# Patient Record
Sex: FEMALE | Race: WHITE | Employment: UNEMPLOYED | URBAN - METROPOLITAN AREA
[De-identification: names, ages, dates, MRNs, and addresses within clinical notes are randomized per-mention and may not be internally consistent; named-entity substitution may affect disease eponyms.]

---

## 2019-12-30 LAB
BASOPHILS # BLD: 0.1 K/UL (ref 0–0.2)
BASOPHILS NFR BLD: 1 % (ref 0–2)
DIFFERENTIAL METHOD BLD: ABNORMAL
EOSINOPHIL # BLD: 0.4 K/UL (ref 0–0.8)
EOSINOPHIL NFR BLD: 6 % (ref 0.5–7.8)
ERYTHROCYTE [DISTWIDTH] IN BLOOD BY AUTOMATED COUNT: 14.9 % (ref 11.9–14.6)
HCT VFR BLD AUTO: 39.3 % (ref 35.8–46.3)
HGB BLD-MCNC: 13 G/DL (ref 11.7–15.4)
IMM GRANULOCYTES # BLD AUTO: 0 K/UL (ref 0–0.5)
IMM GRANULOCYTES NFR BLD AUTO: 0 % (ref 0–5)
LYMPHOCYTES # BLD: 2.7 K/UL (ref 0.5–4.6)
LYMPHOCYTES NFR BLD: 37 % (ref 13–44)
MCH RBC QN AUTO: 26.2 PG (ref 26.1–32.9)
MCHC RBC AUTO-ENTMCNC: 33.1 G/DL (ref 31.4–35)
MCV RBC AUTO: 79.2 FL (ref 79.6–97.8)
MONOCYTES # BLD: 0.7 K/UL (ref 0.1–1.3)
MONOCYTES NFR BLD: 10 % (ref 4–12)
NEUTS SEG # BLD: 3.3 K/UL (ref 1.7–8.2)
NEUTS SEG NFR BLD: 46 % (ref 43–78)
NRBC # BLD: 0 K/UL (ref 0–0.2)
PLATELET # BLD AUTO: 395 K/UL (ref 150–450)
PMV BLD AUTO: 8.8 FL (ref 9.4–12.3)
RBC # BLD AUTO: 4.96 M/UL (ref 4.05–5.2)
WBC # BLD AUTO: 7.2 K/UL (ref 4.3–11.1)

## 2019-12-30 PROCEDURE — 80053 COMPREHEN METABOLIC PANEL: CPT

## 2019-12-30 PROCEDURE — 93005 ELECTROCARDIOGRAM TRACING: CPT | Performed by: EMERGENCY MEDICINE

## 2019-12-30 PROCEDURE — 81003 URINALYSIS AUTO W/O SCOPE: CPT | Performed by: EMERGENCY MEDICINE

## 2019-12-30 PROCEDURE — 99285 EMERGENCY DEPT VISIT HI MDM: CPT | Performed by: EMERGENCY MEDICINE

## 2019-12-30 PROCEDURE — 84481 FREE ASSAY (FT-3): CPT

## 2019-12-30 PROCEDURE — 84439 ASSAY OF FREE THYROXINE: CPT

## 2019-12-30 PROCEDURE — 84443 ASSAY THYROID STIM HORMONE: CPT

## 2019-12-30 PROCEDURE — 85025 COMPLETE CBC W/AUTO DIFF WBC: CPT

## 2019-12-31 ENCOUNTER — APPOINTMENT (OUTPATIENT)
Dept: GENERAL RADIOLOGY | Age: 24
End: 2019-12-31
Attending: EMERGENCY MEDICINE
Payer: COMMERCIAL

## 2019-12-31 ENCOUNTER — HOSPITAL ENCOUNTER (EMERGENCY)
Age: 24
Discharge: HOME | End: 2019-12-31
Attending: EMERGENCY MEDICINE
Payer: COMMERCIAL

## 2019-12-31 VITALS
OXYGEN SATURATION: 100 % | BODY MASS INDEX: 21.34 KG/M2 | TEMPERATURE: 98.5 F | RESPIRATION RATE: 22 BRPM | SYSTOLIC BLOOD PRESSURE: 156 MMHG | HEART RATE: 111 BPM | WEIGHT: 125 LBS | DIASTOLIC BLOOD PRESSURE: 67 MMHG | HEIGHT: 64 IN

## 2019-12-31 DIAGNOSIS — E05.90 HYPERTHYROIDISM: Primary | ICD-10-CM

## 2019-12-31 LAB
ALBUMIN SERPL-MCNC: 3.5 G/DL (ref 3.5–5)
ALBUMIN/GLOB SERPL: 0.9 {RATIO} (ref 1.2–3.5)
ALP SERPL-CCNC: 187 U/L (ref 50–136)
ALT SERPL-CCNC: 30 U/L (ref 12–65)
ANION GAP SERPL CALC-SCNC: 6 MMOL/L (ref 7–16)
AST SERPL-CCNC: 15 U/L (ref 15–37)
ATRIAL RATE: 101 BPM
BILIRUB SERPL-MCNC: 0.3 MG/DL (ref 0.2–1.1)
BNP SERPL-MCNC: 281 PG/ML (ref 5–125)
BUN SERPL-MCNC: 18 MG/DL (ref 6–23)
CALCIUM SERPL-MCNC: 9.9 MG/DL (ref 8.3–10.4)
CALCULATED P AXIS, ECG09: 5 DEGREES
CALCULATED R AXIS, ECG10: 80 DEGREES
CALCULATED T AXIS, ECG11: 46 DEGREES
CHLORIDE SERPL-SCNC: 110 MMOL/L (ref 98–107)
CO2 SERPL-SCNC: 24 MMOL/L (ref 21–32)
CREAT SERPL-MCNC: 0.41 MG/DL (ref 0.6–1)
DIAGNOSIS, 93000: NORMAL
GLOBULIN SER CALC-MCNC: 3.9 G/DL (ref 2.3–3.5)
GLUCOSE SERPL-MCNC: 102 MG/DL (ref 65–100)
HCG UR QL: NEGATIVE
MAGNESIUM SERPL-MCNC: 2.2 MG/DL (ref 1.8–2.4)
P-R INTERVAL, ECG05: 126 MS
POTASSIUM SERPL-SCNC: 4 MMOL/L (ref 3.5–5.1)
PROT SERPL-MCNC: 7.4 G/DL (ref 6.3–8.2)
Q-T INTERVAL, ECG07: 362 MS
QRS DURATION, ECG06: 98 MS
QTC CALCULATION (BEZET), ECG08: 469 MS
SODIUM SERPL-SCNC: 140 MMOL/L (ref 136–145)
T4 FREE SERPL-MCNC: 3.4 NG/DL (ref 0.9–1.8)
TSH SERPL DL<=0.005 MIU/L-ACNC: <0.005 UIU/ML (ref 0.36–3.74)
VENTRICULAR RATE, ECG03: 101 BPM

## 2019-12-31 PROCEDURE — 96360 HYDRATION IV INFUSION INIT: CPT | Performed by: EMERGENCY MEDICINE

## 2019-12-31 PROCEDURE — 83880 ASSAY OF NATRIURETIC PEPTIDE: CPT

## 2019-12-31 PROCEDURE — 81025 URINE PREGNANCY TEST: CPT

## 2019-12-31 PROCEDURE — 71046 X-RAY EXAM CHEST 2 VIEWS: CPT

## 2019-12-31 PROCEDURE — 83735 ASSAY OF MAGNESIUM: CPT

## 2019-12-31 PROCEDURE — 74011250636 HC RX REV CODE- 250/636: Performed by: EMERGENCY MEDICINE

## 2019-12-31 RX ORDER — METHIMAZOLE 10 MG/1
TABLET ORAL
Qty: 90 TAB | Refills: 0 | Status: SHIPPED | OUTPATIENT
Start: 2019-12-31 | End: 2020-01-08 | Stop reason: SDUPTHER

## 2019-12-31 RX ORDER — PROPRANOLOL HYDROCHLORIDE 60 MG/1
60 TABLET ORAL 2 TIMES DAILY
Qty: 60 TAB | Refills: 0 | Status: SHIPPED | OUTPATIENT
Start: 2019-12-31 | End: 2020-01-08 | Stop reason: SDUPTHER

## 2019-12-31 RX ADMIN — SODIUM CHLORIDE 1000 ML: 900 INJECTION, SOLUTION INTRAVENOUS at 03:09

## 2019-12-31 NOTE — ED PROVIDER NOTES
25-year-old female recently diagnosed with hyperthyroidism presents with complaint of dizziness, fatigue, and palpitations. States that symptoms progressively worsened on yesterday. States that she was started on metoprolol 50 mg daily and methimazole 10 mg daily around 1 month ago after being evaluated at urgent care. States that she has a scheduled appointment with endocrinologist on January 8, 2019. She denies any shortness of breath at this time. Patient denies abdominal pain, productive cough, chest pain, hemoptysis, leg swelling or pain, nausea, vomiting, weight loss, night sweats, fever, chills, urinary symptoms. The history is provided by the patient. No  was used. Dizziness   This is a new problem. The current episode started yesterday. The problem has not changed since onset. There was no focality noted. Pertinent negatives include no focal weakness, no loss of sensation, no loss of balance, no slurred speech, no speech difficulty, no memory loss, no movement disorder, no agitation, no visual change, no auditory change, no mental status change, no unresponsiveness and no disorientation. There has been no fever. Pertinent negatives include no shortness of breath, no chest pain, no vomiting, no altered mental status, no confusion, no headaches, no choking, no nausea, no bowel incontinence and no bladder incontinence. There were no medications administered prior to arrival.        No past medical history on file. No past surgical history on file. No family history on file.     Social History     Socioeconomic History    Marital status:      Spouse name: Not on file    Number of children: Not on file    Years of education: Not on file    Highest education level: Not on file   Occupational History    Not on file   Social Needs    Financial resource strain: Not on file    Food insecurity:     Worry: Not on file     Inability: Not on file    Transportation needs: Medical: Not on file     Non-medical: Not on file   Tobacco Use    Smoking status: Not on file   Substance and Sexual Activity    Alcohol use: Not on file    Drug use: Not on file    Sexual activity: Not on file   Lifestyle    Physical activity:     Days per week: Not on file     Minutes per session: Not on file    Stress: Not on file   Relationships    Social connections:     Talks on phone: Not on file     Gets together: Not on file     Attends Roman [employer] service: Not on file     Active member of club or organization: Not on file     Attends meetings of clubs or organizations: Not on file     Relationship status: Not on file    Intimate partner violence:     Fear of current or ex partner: Not on file     Emotionally abused: Not on file     Physically abused: Not on file     Forced sexual activity: Not on file   Other Topics Concern    Not on file   Social History Narrative    Not on file         ALLERGIES: Patient has no known allergies. Review of Systems   Constitutional: Positive for fatigue. Negative for chills, diaphoresis and fever. HENT: Negative for congestion and rhinorrhea. Respiratory: Negative for cough, choking and shortness of breath. Cardiovascular: Positive for palpitations. Negative for chest pain. Gastrointestinal: Negative for abdominal pain, blood in stool, bowel incontinence, constipation, diarrhea, nausea and vomiting. Genitourinary: Negative for bladder incontinence, dysuria, flank pain, vaginal bleeding and vaginal discharge. Musculoskeletal: Negative for neck pain and neck stiffness. Skin: Negative for rash and wound. Neurological: Positive for dizziness. Negative for focal weakness, seizures, facial asymmetry, speech difficulty, weakness, numbness, headaches and loss of balance. Psychiatric/Behavioral: Negative for agitation, confusion and memory loss.        Vitals:    12/30/19 2319   BP: 132/68   Pulse: (!) 118   Resp: 16   Temp: 98.5 °F (36.9 °C) SpO2: 96%   Weight: 56.7 kg (125 lb)   Height: 5' 4\" (1.626 m)            Physical Exam  Vitals signs and nursing note reviewed. Constitutional:       Appearance: She is well-developed. Comments: Well appearing and in NAD. HENT:      Head: Normocephalic. Comments: MMM. Uvula midline. Patient tolerating secretions. Eyes:      Conjunctiva/sclera: Conjunctivae normal.      Pupils: Pupils are equal, round, and reactive to light. Comments: Proptosis. Neck:      Vascular: No JVD. Trachea: No tracheal deviation. Comments: Thyromegaly present. Cardiovascular:      Rate and Rhythm: Regular rhythm. Tachycardia present. Pulses: Normal pulses. Heart sounds: Normal heart sounds. Pulmonary:      Effort: Pulmonary effort is normal.      Breath sounds: Normal breath sounds. Comments: CTAB. Abdominal:      Palpations: Abdomen is soft. Comments: Soft, NTND. Musculoskeletal: Normal range of motion. Comments: No LE edema. No calf TTP. Skin:     General: Skin is warm and dry. Comments: No rash. Neurological:      General: No focal deficit present. Mental Status: She is alert and oriented to person, place, and time. Cranial Nerves: No cranial nerve deficit. Comments: Strength 5/5 throughout. Normal sensory. MDM  Number of Diagnoses or Management Options  Hyperthyroidism: new and requires workup  Diagnosis management comments: TSH less than 0.005. Free T4 3.4. UPT negative. UA negative. Remainder of labs unremarkable. Chest x-ray clear. EKG with sinus tachycardia. Patient given 1 L normal sent IV fluid bolus. Orthostatics negative. Endocrinology consulted. Dr. Raffi Nash on call. Recommends switching patient from metoprolol to propranolol 60 mg twice daily as well as increasing patient for methimazole 10 mg daily to 30 mg daily. States that he believes that he will be able to get her in later this week.   Patient given strict return precautions. Patient in agreement with plan. Amount and/or Complexity of Data Reviewed  Clinical lab tests: ordered and reviewed  Tests in the radiology section of CPT®: ordered and reviewed  Tests in the medicine section of CPT®: ordered and reviewed  Review and summarize past medical records: yes  Independent visualization of images, tracings, or specimens: yes    Risk of Complications, Morbidity, and/or Mortality  Presenting problems: moderate  Diagnostic procedures: moderate  Management options: moderate      ED Course as of Dec 31 0405   Tue Dec 31, 2019   0225 CXR IMPRESSION: Normal chest.    [DF]   0340 Orthostatics within normal limits. [DF]   3308 Endocrinology consulted. [DF]      ED Course User Index  [DF] Jennifer Hodgson MD       EKG  Date/Time: 12/31/2019 1:54 AM  Performed by: Jennifer Hodgson MD  Authorized by:  Jennifer Hodgson MD     ECG reviewed by ED Physician in the absence of a cardiologist: yes    Rate:     ECG rate:  101    ECG rate assessment: tachycardic    Rhythm:     Rhythm: sinus tachycardia    Ectopy:     Ectopy: none    QRS:     QRS axis:  Normal    QRS intervals:  Normal  ST segments:     ST segments:  Normal  T waves:     T waves: normal            Results Include:    Recent Results (from the past 24 hour(s))   EKG, 12 LEAD, INITIAL    Collection Time: 12/30/19 11:23 PM   Result Value Ref Range    Ventricular Rate 101 BPM    Atrial Rate 101 BPM    P-R Interval 126 ms    QRS Duration 98 ms    Q-T Interval 362 ms    QTC Calculation (Bezet) 469 ms    Calculated P Axis 5 degrees    Calculated R Axis 80 degrees    Calculated T Axis 46 degrees    Diagnosis       Sinus tachycardia  Minimal voltage criteria for LVH, may be normal variant  Nonspecific ST and T wave abnormality  Abnormal ECG  No previous ECGs available     CBC WITH AUTOMATED DIFF    Collection Time: 12/30/19 11:29 PM   Result Value Ref Range    WBC 7.2 4.3 - 11.1 K/uL    RBC 4. 96 4.05 - 5.2 M/uL    HGB 13.0 11.7 - 15.4 g/dL    HCT 39.3 35.8 - 46.3 %    MCV 79.2 (L) 79.6 - 97.8 FL    MCH 26.2 26.1 - 32.9 PG    MCHC 33.1 31.4 - 35.0 g/dL    RDW 14.9 (H) 11.9 - 14.6 %    PLATELET 390 558 - 506 K/uL    MPV 8.8 (L) 9.4 - 12.3 FL    ABSOLUTE NRBC 0.00 0.0 - 0.2 K/uL    DF AUTOMATED      NEUTROPHILS 46 43 - 78 %    LYMPHOCYTES 37 13 - 44 %    MONOCYTES 10 4.0 - 12.0 %    EOSINOPHILS 6 0.5 - 7.8 %    BASOPHILS 1 0.0 - 2.0 %    IMMATURE GRANULOCYTES 0 0.0 - 5.0 %    ABS. NEUTROPHILS 3.3 1.7 - 8.2 K/UL    ABS. LYMPHOCYTES 2.7 0.5 - 4.6 K/UL    ABS. MONOCYTES 0.7 0.1 - 1.3 K/UL    ABS. EOSINOPHILS 0.4 0.0 - 0.8 K/UL    ABS. BASOPHILS 0.1 0.0 - 0.2 K/UL    ABS. IMM. GRANS. 0.0 0.0 - 0.5 K/UL   METABOLIC PANEL, COMPREHENSIVE    Collection Time: 12/30/19 11:29 PM   Result Value Ref Range    Sodium 140 136 - 145 mmol/L    Potassium 4.0 3.5 - 5.1 mmol/L    Chloride 110 (H) 98 - 107 mmol/L    CO2 24 21 - 32 mmol/L    Anion gap 6 (L) 7 - 16 mmol/L    Glucose 102 (H) 65 - 100 mg/dL    BUN 18 6 - 23 MG/DL    Creatinine 0.41 (L) 0.6 - 1.0 MG/DL    GFR est AA >60 >60 ml/min/1.73m2    GFR est non-AA >60 >60 ml/min/1.73m2    Calcium 9.9 8.3 - 10.4 MG/DL    Bilirubin, total 0.3 0.2 - 1.1 MG/DL    ALT (SGPT) 30 12 - 65 U/L    AST (SGOT) 15 15 - 37 U/L    Alk. phosphatase 187 (H) 50 - 136 U/L    Protein, total 7.4 6.3 - 8.2 g/dL    Albumin 3.5 3.5 - 5.0 g/dL    Globulin 3.9 (H) 2.3 - 3.5 g/dL    A-G Ratio 0.9 (L) 1.2 - 3.5     TSH 3RD GENERATION    Collection Time: 12/30/19 11:29 PM   Result Value Ref Range    TSH <0.005 (L) 0.358 - 3.740 uIU/mL              Reese Pinon MD; 12/31/2019 @1:54 AM Voice dictation software was used during the making of this note. This software is not perfect and grammatical and other typographical errors may be present.   This note has not been proofread for errors.  ===================================================================

## 2019-12-31 NOTE — ED TRIAGE NOTES
Reports dizziness, shortness of breath, fatigue and palpitations. Onset yesterday however has been seen twice at urgent care, once for swelling to neck and palpitations and once for strep. Recently completed abx for strep. Denies cough or chest pain. Recently placed on meds \"to slow my heart and one to slow my thyroid\".  Scheduled appt with endocrinologist 1/8

## 2020-01-01 LAB — T3FREE SERPL-MCNC: 22.9 PG/ML (ref 2–4.4)

## 2020-01-08 PROBLEM — E04.1 THYROID NODULE: Status: ACTIVE | Noted: 2020-01-08

## 2020-01-08 PROBLEM — R00.2 PALPITATIONS: Status: ACTIVE | Noted: 2020-01-08

## 2020-01-08 PROBLEM — E05.00 GRAVES' DISEASE: Status: ACTIVE | Noted: 2020-01-08

## 2020-01-08 PROBLEM — H05.20 EXOPHTHALMOS: Status: ACTIVE | Noted: 2020-01-08

## 2020-07-30 DIAGNOSIS — E05.00 GRAVES DISEASE: Primary | ICD-10-CM

## 2020-08-05 ENCOUNTER — HOSPITAL ENCOUNTER (OUTPATIENT)
Dept: SURGERY | Age: 25
Discharge: HOME | End: 2020-08-05

## 2020-08-06 VITALS — WEIGHT: 126 LBS | HEIGHT: 64 IN | BODY MASS INDEX: 21.51 KG/M2

## 2020-08-06 RX ORDER — LEVONORGESTREL / ETHINYL ESTRADIOL AND ETHINYL ESTRADIOL 150-30(84)
1 KIT ORAL
COMMUNITY

## 2020-08-06 NOTE — PERIOP NOTES
Patient verified name and . Order for consent found in EHR and matches case posting; patient verifies procedure. Type 2 surgery, phone assessment complete. Orders received. Labs per surgeon: none needed. Labs per anesthesia protocol: HGB. Coming to the outpatient lab. Instructed on where and when to come. EKG: not needed per anesthesia protocols. ECHO (2020) and EKG (19) available in EHR for reference if needed. Patient answered medical/surgical history questions at their best of ability. All prior to admission medications documented in [facility]. Patient instructed to take the following medications the day of surgery according to anesthesia guidelines with a small sip of water: Methimazole, Propranolol. Hold all vitamins 7 days prior to surgery and NSAIDS 5 days prior to surgery. Prescription meds to hold: none. Instructed to bring birthcontrol dos. Patient instructed on the following:    >A negative Covid swab result is required to proceed with surgery; Appointment date/time 2020:  Collected:  20 1428    Result status:  Final    Resulting lab:  [facility]    Reference range:  Not Detected    Value:  Not Detected        > 1 visitor allowed at this time. >Arrive at [facility], time of arrival to be called the day before by 1700  >NPO after midnight including gum, mints, and ice chips  >Responsible adult must drive patient to the hospital, stay during surgery, and patient will need supervision 24 hours after anesthesia  >Use Hibiclens in shower the night before surgery and on the morning of surgery  >All piercings must be removed prior to arrival.    >Leave all valuables (money and jewelry) at home but bring insurance card and ID on  DOS. >Do not wear make-up, nail polish, lotions, cologne, perfumes, powders, or oil on skin.

## 2020-08-07 ENCOUNTER — HOSPITAL ENCOUNTER (OUTPATIENT)
Dept: LAB | Age: 25
Discharge: HOME | End: 2020-08-07
Payer: COMMERCIAL

## 2020-08-07 LAB — HGB BLD-MCNC: 14.8 G/DL (ref 11.7–15.4)

## 2020-08-07 PROCEDURE — 85018 HEMOGLOBIN: CPT

## 2020-08-07 PROCEDURE — 36415 COLL VENOUS BLD VENIPUNCTURE: CPT

## 2020-08-07 NOTE — PERIOP NOTES
The below lab results are within anesthesia limits, no further action is required.      Recent Results (from the past 12 hour(s))   HEMOGLOBIN    Collection Time: 08/07/20  2:00 PM   Result Value Ref Range    HGB 14.8 11.7 - 15.4 g/dL

## 2020-08-11 ENCOUNTER — ANESTHESIA EVENT (OUTPATIENT)
Dept: SURGERY | Age: 25
End: 2020-08-11
Payer: COMMERCIAL

## 2020-08-12 ENCOUNTER — HOSPITAL ENCOUNTER (OUTPATIENT)
Age: 25
Discharge: HOME | End: 2020-08-13
Attending: OTOLARYNGOLOGY | Admitting: OTOLARYNGOLOGY
Payer: COMMERCIAL

## 2020-08-12 ENCOUNTER — ANESTHESIA (OUTPATIENT)
Dept: SURGERY | Age: 25
End: 2020-08-12
Payer: COMMERCIAL

## 2020-08-12 DIAGNOSIS — E05.00 GRAVES DISEASE: ICD-10-CM

## 2020-08-12 LAB
CALCIUM SERPL-MCNC: 8.3 MG/DL (ref 8.3–10.4)
HCG UR QL: NEGATIVE

## 2020-08-12 PROCEDURE — 74011250636 HC RX REV CODE- 250/636: Performed by: OTOLARYNGOLOGY

## 2020-08-12 PROCEDURE — 77030002933 HC SUT MCRYL J&J -A: Performed by: OTOLARYNGOLOGY

## 2020-08-12 PROCEDURE — 77030032988 HC TU ET NIM TRIVNTG EMG MEDT -D: Performed by: OTOLARYNGOLOGY

## 2020-08-12 PROCEDURE — 77030014008 HC SPNG HEMSTAT J&J -C: Performed by: OTOLARYNGOLOGY

## 2020-08-12 PROCEDURE — 77030011267 HC ELECTRD BLD COVD -A: Performed by: OTOLARYNGOLOGY

## 2020-08-12 PROCEDURE — 77030031139 HC SUT VCRL2 J&J -A: Performed by: OTOLARYNGOLOGY

## 2020-08-12 PROCEDURE — 74011250636 HC RX REV CODE- 250/636: Performed by: ANESTHESIOLOGY

## 2020-08-12 PROCEDURE — 77030021678 HC GLIDESCP STAT DISP VERT -B: Performed by: ANESTHESIOLOGY

## 2020-08-12 PROCEDURE — 77030040356 HC CORD BPLR FRCP COVD -A: Performed by: OTOLARYNGOLOGY

## 2020-08-12 PROCEDURE — 99218 HC RM OBSERVATION: CPT

## 2020-08-12 PROCEDURE — 74011250637 HC RX REV CODE- 250/637: Performed by: ANESTHESIOLOGY

## 2020-08-12 PROCEDURE — 74011000250 HC RX REV CODE- 250: Performed by: ANESTHESIOLOGY

## 2020-08-12 PROCEDURE — 88307 TISSUE EXAM BY PATHOLOGIST: CPT

## 2020-08-12 PROCEDURE — 76010000175 HC OR TIME 4 TO 4.5 HR INTENSV-TIER 1: Performed by: OTOLARYNGOLOGY

## 2020-08-12 PROCEDURE — 77030008462 HC STPLR SKN PROX J&J -A: Performed by: OTOLARYNGOLOGY

## 2020-08-12 PROCEDURE — 74011250636 HC RX REV CODE- 250/636: Performed by: NURSE ANESTHETIST, CERTIFIED REGISTERED

## 2020-08-12 PROCEDURE — 77030040361 HC SLV COMPR DVT MDII -B

## 2020-08-12 PROCEDURE — 77030002996 HC SUT SLK J&J -A: Performed by: OTOLARYNGOLOGY

## 2020-08-12 PROCEDURE — 77030027138 HC INCENT SPIROMETER -A

## 2020-08-12 PROCEDURE — 74011000250 HC RX REV CODE- 250: Performed by: NURSE ANESTHETIST, CERTIFIED REGISTERED

## 2020-08-12 PROCEDURE — 74011250637 HC RX REV CODE- 250/637: Performed by: OTOLARYNGOLOGY

## 2020-08-12 PROCEDURE — 36415 COLL VENOUS BLD VENIPUNCTURE: CPT

## 2020-08-12 PROCEDURE — 76060000039 HC ANESTHESIA 4 TO 4.5 HR: Performed by: OTOLARYNGOLOGY

## 2020-08-12 PROCEDURE — 76210000006 HC OR PH I REC 0.5 TO 1 HR: Performed by: OTOLARYNGOLOGY

## 2020-08-12 PROCEDURE — 77030010512 HC APPL CLP LIG J&J -C: Performed by: OTOLARYNGOLOGY

## 2020-08-12 PROCEDURE — 77030019655 HC PRB STIM CRAN MEDT -B: Performed by: OTOLARYNGOLOGY

## 2020-08-12 PROCEDURE — 77030040506 HC DRN WND MDII -A: Performed by: OTOLARYNGOLOGY

## 2020-08-12 PROCEDURE — 74011000250 HC RX REV CODE- 250: Performed by: OTOLARYNGOLOGY

## 2020-08-12 PROCEDURE — 82310 ASSAY OF CALCIUM: CPT

## 2020-08-12 PROCEDURE — 77030040922 HC BLNKT HYPOTHRM STRY -A: Performed by: ANESTHESIOLOGY

## 2020-08-12 PROCEDURE — 81025 URINE PREGNANCY TEST: CPT

## 2020-08-12 PROCEDURE — 77030019908 HC STETH ESOPH SIMS -A: Performed by: ANESTHESIOLOGY

## 2020-08-12 RX ORDER — MIDAZOLAM HYDROCHLORIDE 1 MG/ML
2 INJECTION, SOLUTION INTRAMUSCULAR; INTRAVENOUS ONCE
Status: DISCONTINUED | OUTPATIENT
Start: 2020-08-12 | End: 2020-08-12 | Stop reason: HOSPADM

## 2020-08-12 RX ORDER — NALOXONE HYDROCHLORIDE 0.4 MG/ML
0.04 INJECTION, SOLUTION INTRAMUSCULAR; INTRAVENOUS; SUBCUTANEOUS
Status: DISCONTINUED | OUTPATIENT
Start: 2020-08-12 | End: 2020-08-12

## 2020-08-12 RX ORDER — FENTANYL CITRATE 50 UG/ML
INJECTION, SOLUTION INTRAMUSCULAR; INTRAVENOUS AS NEEDED
Status: DISCONTINUED | OUTPATIENT
Start: 2020-08-12 | End: 2020-08-12 | Stop reason: HOSPADM

## 2020-08-12 RX ORDER — SODIUM CHLORIDE 0.9 % (FLUSH) 0.9 %
5-40 SYRINGE (ML) INJECTION EVERY 8 HOURS
Status: DISCONTINUED | OUTPATIENT
Start: 2020-08-12 | End: 2020-08-13 | Stop reason: HOSPADM

## 2020-08-12 RX ORDER — MORPHINE SULFATE 2 MG/ML
2 INJECTION, SOLUTION INTRAMUSCULAR; INTRAVENOUS
Status: DISCONTINUED | OUTPATIENT
Start: 2020-08-12 | End: 2020-08-13 | Stop reason: HOSPADM

## 2020-08-12 RX ORDER — DEXAMETHASONE SODIUM PHOSPHATE 4 MG/ML
INJECTION, SOLUTION INTRA-ARTICULAR; INTRALESIONAL; INTRAMUSCULAR; INTRAVENOUS; SOFT TISSUE AS NEEDED
Status: DISCONTINUED | OUTPATIENT
Start: 2020-08-12 | End: 2020-08-12 | Stop reason: HOSPADM

## 2020-08-12 RX ORDER — ROCURONIUM BROMIDE 10 MG/ML
INJECTION, SOLUTION INTRAVENOUS AS NEEDED
Status: DISCONTINUED | OUTPATIENT
Start: 2020-08-12 | End: 2020-08-12 | Stop reason: HOSPADM

## 2020-08-12 RX ORDER — SCOLOPAMINE TRANSDERMAL SYSTEM 1 MG/1
1 PATCH, EXTENDED RELEASE TRANSDERMAL ONCE
Status: DISCONTINUED | OUTPATIENT
Start: 2020-08-12 | End: 2020-08-13 | Stop reason: HOSPADM

## 2020-08-12 RX ORDER — EPINEPHRINE NASAL SOLUTION 1 MG/ML
SOLUTION NASAL AS NEEDED
Status: DISCONTINUED | OUTPATIENT
Start: 2020-08-12 | End: 2020-08-12 | Stop reason: HOSPADM

## 2020-08-12 RX ORDER — SODIUM CHLORIDE, SODIUM LACTATE, POTASSIUM CHLORIDE, CALCIUM CHLORIDE 600; 310; 30; 20 MG/100ML; MG/100ML; MG/100ML; MG/100ML
100 INJECTION, SOLUTION INTRAVENOUS CONTINUOUS
Status: DISCONTINUED | OUTPATIENT
Start: 2020-08-13 | End: 2020-08-12

## 2020-08-12 RX ORDER — OXYCODONE HYDROCHLORIDE 5 MG/1
5 TABLET ORAL
Status: DISCONTINUED | OUTPATIENT
Start: 2020-08-12 | End: 2020-08-12

## 2020-08-12 RX ORDER — NALOXONE HYDROCHLORIDE 0.4 MG/ML
0.4 INJECTION, SOLUTION INTRAMUSCULAR; INTRAVENOUS; SUBCUTANEOUS AS NEEDED
Status: DISCONTINUED | OUTPATIENT
Start: 2020-08-12 | End: 2020-08-13 | Stop reason: HOSPADM

## 2020-08-12 RX ORDER — LIDOCAINE HYDROCHLORIDE 20 MG/ML
INJECTION, SOLUTION EPIDURAL; INFILTRATION; INTRACAUDAL; PERINEURAL AS NEEDED
Status: DISCONTINUED | OUTPATIENT
Start: 2020-08-12 | End: 2020-08-12 | Stop reason: HOSPADM

## 2020-08-12 RX ORDER — ONDANSETRON 2 MG/ML
INJECTION INTRAMUSCULAR; INTRAVENOUS AS NEEDED
Status: DISCONTINUED | OUTPATIENT
Start: 2020-08-12 | End: 2020-08-12 | Stop reason: HOSPADM

## 2020-08-12 RX ORDER — HYDROMORPHONE HYDROCHLORIDE 2 MG/ML
0.5 INJECTION, SOLUTION INTRAMUSCULAR; INTRAVENOUS; SUBCUTANEOUS
Status: DISCONTINUED | OUTPATIENT
Start: 2020-08-12 | End: 2020-08-12

## 2020-08-12 RX ORDER — KETAMINE HYDROCHLORIDE 50 MG/ML
INJECTION, SOLUTION INTRAMUSCULAR; INTRAVENOUS AS NEEDED
Status: DISCONTINUED | OUTPATIENT
Start: 2020-08-12 | End: 2020-08-12 | Stop reason: HOSPADM

## 2020-08-12 RX ORDER — LEVOTHYROXINE SODIUM 100 UG/1
100 TABLET ORAL
Status: DISCONTINUED | OUTPATIENT
Start: 2020-08-13 | End: 2020-08-13 | Stop reason: HOSPADM

## 2020-08-12 RX ORDER — SODIUM CHLORIDE 0.9 % (FLUSH) 0.9 %
5-40 SYRINGE (ML) INJECTION AS NEEDED
Status: DISCONTINUED | OUTPATIENT
Start: 2020-08-12 | End: 2020-08-13 | Stop reason: HOSPADM

## 2020-08-12 RX ORDER — LIDOCAINE HYDROCHLORIDE AND EPINEPHRINE 10; 10 MG/ML; UG/ML
INJECTION, SOLUTION INFILTRATION; PERINEURAL AS NEEDED
Status: DISCONTINUED | OUTPATIENT
Start: 2020-08-12 | End: 2020-08-12 | Stop reason: HOSPADM

## 2020-08-12 RX ORDER — LIDOCAINE HYDROCHLORIDE 10 MG/ML
0.1 INJECTION INFILTRATION; PERINEURAL AS NEEDED
Status: DISCONTINUED | OUTPATIENT
Start: 2020-08-12 | End: 2020-08-12 | Stop reason: HOSPADM

## 2020-08-12 RX ORDER — SODIUM CHLORIDE, SODIUM LACTATE, POTASSIUM CHLORIDE, CALCIUM CHLORIDE 600; 310; 30; 20 MG/100ML; MG/100ML; MG/100ML; MG/100ML
100 INJECTION, SOLUTION INTRAVENOUS CONTINUOUS
Status: DISCONTINUED | OUTPATIENT
Start: 2020-08-12 | End: 2020-08-12 | Stop reason: HOSPADM

## 2020-08-12 RX ORDER — EPHEDRINE SULFATE/0.9% NACL/PF 50 MG/5 ML
SYRINGE (ML) INTRAVENOUS AS NEEDED
Status: DISCONTINUED | OUTPATIENT
Start: 2020-08-12 | End: 2020-08-12 | Stop reason: HOSPADM

## 2020-08-12 RX ORDER — HYDROCODONE BITARTRATE AND ACETAMINOPHEN 5; 325 MG/1; MG/1
1 TABLET ORAL
Status: DISCONTINUED | OUTPATIENT
Start: 2020-08-12 | End: 2020-08-13 | Stop reason: HOSPADM

## 2020-08-12 RX ORDER — DIPHENHYDRAMINE HCL 25 MG
25 CAPSULE ORAL
Status: DISCONTINUED | OUTPATIENT
Start: 2020-08-12 | End: 2020-08-13 | Stop reason: HOSPADM

## 2020-08-12 RX ORDER — FENTANYL CITRATE 50 UG/ML
100 INJECTION, SOLUTION INTRAMUSCULAR; INTRAVENOUS ONCE
Status: DISCONTINUED | OUTPATIENT
Start: 2020-08-12 | End: 2020-08-12 | Stop reason: HOSPADM

## 2020-08-12 RX ORDER — PROPOFOL 10 MG/ML
INJECTION, EMULSION INTRAVENOUS AS NEEDED
Status: DISCONTINUED | OUTPATIENT
Start: 2020-08-12 | End: 2020-08-12 | Stop reason: HOSPADM

## 2020-08-12 RX ORDER — MIDAZOLAM HYDROCHLORIDE 1 MG/ML
2 INJECTION, SOLUTION INTRAMUSCULAR; INTRAVENOUS
Status: COMPLETED | OUTPATIENT
Start: 2020-08-12 | End: 2020-08-12

## 2020-08-12 RX ORDER — SODIUM CHLORIDE, SODIUM LACTATE, POTASSIUM CHLORIDE, CALCIUM CHLORIDE 600; 310; 30; 20 MG/100ML; MG/100ML; MG/100ML; MG/100ML
100 INJECTION, SOLUTION INTRAVENOUS CONTINUOUS
Status: DISCONTINUED | OUTPATIENT
Start: 2020-08-12 | End: 2020-08-13 | Stop reason: HOSPADM

## 2020-08-12 RX ORDER — SODIUM CHLORIDE, SODIUM LACTATE, POTASSIUM CHLORIDE, CALCIUM CHLORIDE 600; 310; 30; 20 MG/100ML; MG/100ML; MG/100ML; MG/100ML
100 INJECTION, SOLUTION INTRAVENOUS CONTINUOUS
Status: DISCONTINUED | OUTPATIENT
Start: 2020-08-12 | End: 2020-08-12

## 2020-08-12 RX ORDER — HEPARIN SODIUM 5000 [USP'U]/ML
5000 INJECTION, SOLUTION INTRAVENOUS; SUBCUTANEOUS EVERY 8 HOURS
Status: DISCONTINUED | OUTPATIENT
Start: 2020-08-12 | End: 2020-08-13 | Stop reason: HOSPADM

## 2020-08-12 RX ORDER — ONDANSETRON 2 MG/ML
4 INJECTION INTRAMUSCULAR; INTRAVENOUS
Status: DISCONTINUED | OUTPATIENT
Start: 2020-08-12 | End: 2020-08-13 | Stop reason: HOSPADM

## 2020-08-12 RX ORDER — DOCUSATE SODIUM 100 MG/1
100 CAPSULE, LIQUID FILLED ORAL
Status: DISCONTINUED | OUTPATIENT
Start: 2020-08-12 | End: 2020-08-13 | Stop reason: HOSPADM

## 2020-08-12 RX ORDER — SUCCINYLCHOLINE CHLORIDE 20 MG/ML
INJECTION INTRAMUSCULAR; INTRAVENOUS AS NEEDED
Status: DISCONTINUED | OUTPATIENT
Start: 2020-08-12 | End: 2020-08-12 | Stop reason: HOSPADM

## 2020-08-12 RX ORDER — FERROUS SULFATE, DRIED 160(50) MG
2 TABLET, EXTENDED RELEASE ORAL
Status: DISCONTINUED | OUTPATIENT
Start: 2020-08-12 | End: 2020-08-13 | Stop reason: HOSPADM

## 2020-08-12 RX ADMIN — SUCCINYLCHOLINE CHLORIDE 160 MG: 20 INJECTION, SOLUTION INTRAMUSCULAR; INTRAVENOUS at 08:42

## 2020-08-12 RX ADMIN — FENTANYL CITRATE 100 MCG: 50 INJECTION INTRAMUSCULAR; INTRAVENOUS at 08:42

## 2020-08-12 RX ADMIN — OYSTER SHELL CALCIUM WITH VITAMIN D 2 TABLET: 500; 200 TABLET, FILM COATED ORAL at 17:28

## 2020-08-12 RX ADMIN — SODIUM CHLORIDE, SODIUM LACTATE, POTASSIUM CHLORIDE, AND CALCIUM CHLORIDE: 600; 310; 30; 20 INJECTION, SOLUTION INTRAVENOUS at 11:03

## 2020-08-12 RX ADMIN — LIDOCAINE HYDROCHLORIDE 0.1 ML: 10 INJECTION, SOLUTION INFILTRATION; PERINEURAL at 07:32

## 2020-08-12 RX ADMIN — OYSTER SHELL CALCIUM WITH VITAMIN D 2 TABLET: 500; 200 TABLET, FILM COATED ORAL at 14:37

## 2020-08-12 RX ADMIN — HYDROMORPHONE HYDROCHLORIDE 0.5 MG: 2 INJECTION INTRAMUSCULAR; INTRAVENOUS; SUBCUTANEOUS at 12:57

## 2020-08-12 RX ADMIN — SODIUM CHLORIDE, SODIUM LACTATE, POTASSIUM CHLORIDE, AND CALCIUM CHLORIDE 100 ML/HR: 600; 310; 30; 20 INJECTION, SOLUTION INTRAVENOUS at 07:32

## 2020-08-12 RX ADMIN — Medication 10 MG: at 10:57

## 2020-08-12 RX ADMIN — PROPOFOL 150 MG: 10 INJECTION, EMULSION INTRAVENOUS at 08:42

## 2020-08-12 RX ADMIN — KETAMINE HYDROCHLORIDE 15 MG: 50 INJECTION INTRAMUSCULAR; INTRAVENOUS at 09:45

## 2020-08-12 RX ADMIN — HYDROCODONE BITARTRATE AND ACETAMINOPHEN 1 TABLET: 5; 325 TABLET ORAL at 23:06

## 2020-08-12 RX ADMIN — PROPRANOLOL HYDROCHLORIDE 60 MG: 40 TABLET ORAL at 23:06

## 2020-08-12 RX ADMIN — Medication 10 MG: at 09:12

## 2020-08-12 RX ADMIN — SODIUM CHLORIDE, SODIUM LACTATE, POTASSIUM CHLORIDE, AND CALCIUM CHLORIDE 100 ML/HR: 600; 310; 30; 20 INJECTION, SOLUTION INTRAVENOUS at 23:13

## 2020-08-12 RX ADMIN — LIDOCAINE HYDROCHLORIDE 60 MG: 20 INJECTION, SOLUTION EPIDURAL; INFILTRATION; INTRACAUDAL; PERINEURAL at 08:42

## 2020-08-12 RX ADMIN — KETAMINE HYDROCHLORIDE 30 MG: 50 INJECTION INTRAMUSCULAR; INTRAVENOUS at 08:45

## 2020-08-12 RX ADMIN — PROPRANOLOL HYDROCHLORIDE 60 MG: 40 TABLET ORAL at 17:28

## 2020-08-12 RX ADMIN — Medication 10 ML: at 23:15

## 2020-08-12 RX ADMIN — KETAMINE HYDROCHLORIDE 15 MG: 50 INJECTION INTRAMUSCULAR; INTRAVENOUS at 10:45

## 2020-08-12 RX ADMIN — SODIUM CHLORIDE, SODIUM LACTATE, POTASSIUM CHLORIDE, AND CALCIUM CHLORIDE 100 ML/HR: 600; 310; 30; 20 INJECTION, SOLUTION INTRAVENOUS at 14:42

## 2020-08-12 RX ADMIN — ROCURONIUM BROMIDE 5 MG: 10 INJECTION, SOLUTION INTRAVENOUS at 08:42

## 2020-08-12 RX ADMIN — ONDANSETRON 4 MG: 2 INJECTION INTRAMUSCULAR; INTRAVENOUS at 12:16

## 2020-08-12 RX ADMIN — MIDAZOLAM 2 MG: 1 INJECTION INTRAMUSCULAR; INTRAVENOUS at 07:32

## 2020-08-12 RX ADMIN — HYDROCODONE BITARTRATE AND ACETAMINOPHEN 1 TABLET: 5; 325 TABLET ORAL at 18:41

## 2020-08-12 RX ADMIN — PHENOL 1 SPRAY: 1.5 LIQUID ORAL at 14:46

## 2020-08-12 RX ADMIN — HEPARIN SODIUM 5000 UNITS: 5000 INJECTION INTRAVENOUS; SUBCUTANEOUS at 23:06

## 2020-08-12 RX ADMIN — KETAMINE HYDROCHLORIDE 15 MG: 50 INJECTION INTRAMUSCULAR; INTRAVENOUS at 11:45

## 2020-08-12 RX ADMIN — HYDROCODONE BITARTRATE AND ACETAMINOPHEN 1 TABLET: 5; 325 TABLET ORAL at 14:37

## 2020-08-12 RX ADMIN — DEXAMETHASONE SODIUM PHOSPHATE 10 MG: 4 INJECTION, SOLUTION INTRAMUSCULAR; INTRAVENOUS at 08:54

## 2020-08-12 NOTE — PROGRESS NOTES
Admission assessment complete. Pt resting in bed. A&Ox4. Respirations present, even, unlabored. Lung sounds clear to auscultation. Midline dsg to neck c/d/i. RUPERTO drain patent, charged, draining sanguinous. IVF infusing without difficulty. Bilateral SCDs in place. Pt denies pain at this time. Bed in lowest position, call light within reach, side rails x3. Encouraged to call for help when needed.

## 2020-08-12 NOTE — BRIEF OP NOTE
Brief Postoperative Note    Patient: Gregory Blount  YOB: 1995  MRN: [id number]    Date of Procedure: 8/12/2020     Pre-Op Diagnosis: Graves disease [E05.00]    Post-Op Diagnosis: Graves disease [E05.00]    Procedure(s):  TOTAL THYROIDECTOMY/NIMS    Surgeon(s):  Sayra Daniels MD    Surgical Assistant: None    Anesthesia: General     Estimated Blood Loss (mL): 20 cc    IVF: 1400 cc    UOP: 390 cc    Complications: None    Specimens:   ID Type Source Tests Collected by Time Destination   1 : total thyroidectomy Fresh Thyroid  Sayra Daniels MD 8/12/2020 1158 Pathology        Implants: * No implants in log *    Drains:   Lazarus Bolder #1 08/12/20 Anterior Neck (Active)       Findings: large multinodular hypervascular gland c/w Graves      Electronically Signed by Josette Thompson MD on 8/12/2020 at 12:33 PM

## 2020-08-12 NOTE — H&P
24 yo female who has h/o Graves disease. She has thyromegaly and compressive sxs of neck fullness and dysphagia. Her dose of Tapazole was recently increased up to 30 mg as she was still under-treated- she actually feels like her gland has decreased in size recently. She takes Propranolol as well. 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 Blake Sequeira at [facility]. She does c/o intermittent heart palpitations and sweating but no tremors. There is no FH of thyroid cancer or other thyroid pathology.      Past Medical History:   Diagnosis Date    Anxiety     Depression     GERD (gastroesophageal reflux disease)     occasionally; no meds     Heart palpitations     Hyperthyroidism     on med for control     Tachycardia     takes propranolol      Past Surgical History:   Procedure Laterality Date    HX WISDOM TEETH EXTRACTION       Social History     Socioeconomic History    Marital status:      Spouse name: Not on file    Number of children: Not on file    Years of education: Not on file    Highest education level: Not on file   Occupational History    Not on file   Social Needs    Financial resource strain: Not on file    Food insecurity     Worry: Not on file     Inability: Not on file    Transportation needs     Medical: Not on file     Non-medical: Not on file   Tobacco Use    Smoking status: Never Smoker    Smokeless tobacco: Never Used   Substance and Sexual Activity    Alcohol use: Never     Frequency: Never    Drug use: Never    Sexual activity: Not on file   Lifestyle    Physical activity     Days per week: Not on file     Minutes per session: Not on file    Stress: Not on file   Relationships    Social connections     Talks on phone: Not on file     Gets together: Not on file     Attends Orthodox service: Not on file     Active member of club or organization: Not on file     Attends meetings of clubs or organizations: Not on file     Relationship status: Not on file    Intimate partner violence     Fear of current or ex partner: Not on file     Emotionally abused: Not on file     Physically abused: Not on file     Forced sexual activity: Not on file   Other Topics Concern    Not on file   Social History Narrative    Not on file     Family History   Problem Relation Age of Onset    Hypertension Mother     Hypertension Father     Neuropathy Father     Thyroid Cancer Neg Hx     Thyroid Disease Neg Hx      Allergies   Allergen Reactions    Avocado Other (comments)     Throat pain     Banana Nausea and Vomiting    Kiwi Other (comments)     Chest pain, throat pain      No current facility-administered medications on file prior to encounter. Current Outpatient Medications on File Prior to Encounter   Medication Sig Dispense Refill    methIMAzole (TAPAZOLE) 10 mg tablet Take 3 Tabs by mouth daily. 90 Tab 3    propranoloL (INDERAL) 60 mg tablet Take 1 Tab by mouth three (3) times daily. 90 Tab 3     EXAM:  Visit Vitals  /59 (BP 1 Location: Left arm)   Pulse 84   Temp 97.9 °F (36.6 °C)   Resp 18   Ht 5' 4\" (1.626 m)   Wt 136 lb (61.7 kg)   LMP  (LMP Unknown) Comment: on birth control; has a period every 3 months   SpO2 98%   BMI 23.34 kg/m²     General: NAD, well-appearing  Neuro: No gross neuro deficits. CN's II-XII intact. No facial weakness. Eyes: There is exophthalmos. EOMI. Pupils reactive. No periorbital edema/ecchymosis. No nystagmus. Skin: No facial erythema, rashes or concerning lesions. Nose: No external deviations or saddling. Intranasally, septum is midline without perforations, nasal mucosa appears healthy with no erythema, mucopurulence, or polyps. Mouth: Moist mucus membranes, normal tongue/palate mobility, no concerning mucosal lesions. Oropharynx clear with no erythema/exudate, no tonsillar hypertrophy. Ears: Normal appearing auricles, no hematomas. EACs clear with no cerumen impaction, healthy canal skin, TM's intact with no perforations or retraction pockets. No middle ear effusions. Neck: Soft, supple, no palpable neck masses. There is extensive thyromegaly but no palpable thyroid nodules. No surgical scars. Lymphatics: No palpable cervical LAD. Resp: No audible stridor or wheezing. Extremities: No clubbing or cyanosis.           Lab Results   Component Value Date/Time     TSH <0.006 (L) 05/12/2020 03:53 PM     IMAGING:  I reviewed her recent thyroid US-     5/11/2020: Ultrasound ([facility])- Right lobe 2.83 x 3.68 x 7.9 cm, isthmus 0.73 cm, left lobe 2.78 x 3.06 x 8.25 cm.  Heterogeneous echotexture.  Increased blood flow.  0.87 x 1.41 x 1.34 cm isoechoic nodule without calcifications or increased blood flow in the posterior midportion of the right lobe. A/P:  She has thyromegaly and underlying Graves disease w/ some compressive sxs. Her dose of Tapazole was recently increased but at her age and w/ extra-thyroidal manifestations, I agree w/ surgical cure of her Graves. I would recommend total thyroidectomy w/ NIMS.  I discussed the risks of thyroid surgery including bleeding/hematoma, damage to recurrent laryngeal nerve w/ subsequent hoarseness or stridor, possible need for tracheostomy, hypocalcemia, need for lifetime thyroid supplementation, recurrence, and need for further procedures, and she would like to proceed    HTC

## 2020-08-12 NOTE — PROGRESS NOTES
POST-OP CHECK    Doing well after her thyroidectomy- awake and alert. Some pain but well tolerated. Some nausea as well. No voice issues and taking in some liquids already. Ca was 8.3 on initial check- denies any numbness or tingling. Exam reveals smooth neck w/ no hematomas and just mild ecchymosis from retraction. Will recheck Ca in morning w/ hopeful D/C.     Gume Echeverria MD

## 2020-08-12 NOTE — PERIOP NOTES
TRANSFER - OUT REPORT:    Verbal report given to ruth(name) on Tamiko Vasquez  being transferred to [facility](unit) for routine post - op       Report consisted of patients Situation, Background, Assessment and   Recommendations(SBAR). Information from the following report(s) SBAR, OR Summary and MAR was reviewed with the receiving nurse. Lines:   Peripheral IV 08/12/20 Right Hand (Active)   Site Assessment Clean, dry, & intact 08/12/20 0731   Phlebitis Assessment 0 08/12/20 0731   Infiltration Assessment 0 08/12/20 0731   Dressing Status Clean, dry, & intact 08/12/20 0731   Dressing Type Transparent 08/12/20 0731   Hub Color/Line Status Infusing;Pink 08/12/20 0731        Opportunity for questions and clarification was provided.       Patient transported with:             Jul

## 2020-08-12 NOTE — ANESTHESIA PREPROCEDURE EVALUATION
Relevant Problems   No relevant active problems       Anesthetic History   No history of anesthetic complications            Review of Systems / Medical History  Pertinent labs reviewed    Pulmonary  Within defined limits                 Neuro/Psych         Psychiatric history     Cardiovascular  Within defined limits                Exercise tolerance: >4 METS     GI/Hepatic/Renal     GERD: well controlled           Endo/Other      Hyperthyroidism: well controlled       Other Findings              Physical Exam    Airway  Mallampati: I  TM Distance: 4 - 6 cm  Neck ROM: normal range of motion   Mouth opening: Normal    Comments: No airway compromise by mass.  Cardiovascular  Regular rate and rhythm,  S1 and S2 normal,  no murmur, click, rub, or gallop             Dental  No notable dental hx       Pulmonary  Breath sounds clear to auscultation               Abdominal  GI exam deferred       Other Findings            Anesthetic Plan    ASA: 2  Anesthesia type: general          Induction: Intravenous  Anesthetic plan and risks discussed with: Patient and Spouse

## 2020-08-12 NOTE — PROGRESS NOTES
TRANSFER - IN REPORT:    Verbal report received from [address] [place] [address] on Marybeth Ormond  being received from PACU for routine post - op      Report consisted of patients Situation, Background, Assessment and   Recommendations(SBAR). Information from the following report(s) SBAR, Kardex, OR Summary, Intake/Output, MAR and Recent Results was reviewed with the receiving nurse. Opportunity for questions and clarification was provided.

## 2020-08-12 NOTE — ANESTHESIA POSTPROCEDURE EVALUATION
Procedure(s):  TOTAL THYROIDECTOMY/NIMS.     general    Anesthesia Post Evaluation        Patient location during evaluation: PACU  Patient participation: complete - patient participated  Level of consciousness: awake  Pain management: satisfactory to patient  Airway patency: patent  Anesthetic complications: no  Cardiovascular status: hemodynamically stable  Respiratory status: spontaneous ventilation  Hydration status: euvolemic  Post anesthesia nausea and vomiting:  none      INITIAL Post-op Vital signs:   Vitals Value Taken Time   /55 8/12/2020 12:59 PM   Temp 37.1 °C (98.8 °F) 8/12/2020 12:44 PM   Pulse 93 8/12/2020 12:59 PM   Resp 16 8/12/2020 12:59 PM   SpO2 99 % 8/12/2020 12:59 PM

## 2020-08-12 NOTE — OP NOTES
[place]  OPERATIVE REPORT    Name:  Laura Díaz  MR#:  [id number]  :  1995  ACCOUNT #:  [de-identified]  DATE OF SERVICE:  2020    PREOPERATIVE DIAGNOSIS:  Graves' disease. POSTOPERATIVE DIAGNOSIS:  Graves' disease. PROCEDURE PERFORMED:  Total thyroidectomy. SURGEON:  Roddy Retana. Rusty Hernandez MD    ANESTHESIA:  General endotracheal.    ANESTHESIOLOGIST:  Brandy Grewal MD    COMPLICATIONS:  None. SPECIMENS REMOVED:  Total thyroidectomy - permanent. ESTIMATED BLOOD LOSS:  20 mL. OPERATIVE FINDINGS:  1. There was an enlarged hypervascular, and multinodular thyroid gland consistent with Graves' disease. Total thyroidectomy was performed. 2.  Both recurrent laryngeal nerves were identified, carefully dissected out and stimulated strongly throughout the case. 3.  The left superior, right superior, and right inferior parathyroid glands were identified and preserved. IV FLUIDS:  1400 mL crystalloid. URINE OUTPUT:  350 mL. DRAINS:  RUPERTO x 1. DISPOSITION:  PACU, then third floor. CONDITION:  Stable. BRIEF HISTORY:  The patient is a 68-year-old female who was diagnosed with Graves' disease approximately 1 year ago. 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 She was evaluated in the office and deemed good candidate for a total thyroidectomy for surgical care of her Graves' disease. DESCRIPTION OF PROCEDURE:  The patient was brought back to the operating room and placed on the table in the supine position. General endotracheal anesthesia was inducted without any complications. A Medtronic recurrent laryngeal nerve monitoring tube was placed and its position was confirmed within the glottis using the GlideScope. Once the patient was adequately sedated, a total of 10 mL of 1% lidocaine with 1:100,000 epinephrine was injected along the planned incision line.   She was then sterilely prepped and draped in the usual fashion. I began by designing a 7 cm incision along the natural neck skin crease approximately two fingerbreadths above the sternal notch. I incised through the skin and dermis with a 15 blade and then dissected through some subcutaneous fat and platysma muscle using Bovie electrocautery. Next, superior and inferiorly-based subplatysmal skin flaps were raised for improved exposure. I dissected along the midline raphe and lateralized the strap muscles. I dissected down onto a very large thyroid isthmus. I then began my dissection on the left side. I carefully elevated the strap muscles away from the left thyroid lobe and dissected laterally all the way to the carotid artery. Middle thyroid vein was ligated and divided. I then moved superiorly. I developed an avascular dissection plane just medial to the superior pole and then carefully grasped the superior pole with a Justin clamp. I then ligated and divided the superior vascular pedicle using 2-0 silk sutures. The left superior laryngeal nerve could be identified just medial to the superior vascular pedicle and it was carefully preserved. I then continued my dissection along the superior and lateral aspect of the gland. There was no tubercle present. I then moved inferiorly. I dissected down onto the anterior tracheal wall and then began carefully dissecting out the left inferior thyroid lobe. The inferior thyroid vessels were ligated and divided. I stayed tight to the thyroid capsule, but could not identify the left inferior parathyroid gland. I continued dissecting along the lateral aspect of the gland. The middle thyroid vein was ligated and divided. Next, I retracted the gland from lateral to medial and identified the left recurrent laryngeal nerve distally near its insertion into cricothyroid joint. It stimulated strongly during this entire dissection.   While preserving the underlying nerve branches, I carefully divided across Berry's ligament. The superior thyroid artery was divided. Just superior to the artery was a likely left superior parathyroid gland which was carefully dissected away from the thyroid capsule. I continued my dissection across Lares's ligament while protecting the underlying nerve branches. This dissection continued onto the anterior tracheal wall. I then moved superiorly and began carefully dissecting out a small pyramidal lobe. Next, I turned my attention the right side where a similar procedure was performed. I first carefully elevated the strap muscles away from the right thyroid lobe and dissected laterally all the way towards the carotid artery. The middle thyroid vein was ligated and divided. I had previously dissected down onto the anterior tracheal wall inferiorly. I followed this dissection plane staying tight to the thyroid capsule as I ligated and divided the inferior thyroid vessels. The right inferior parathyroid gland was carefully dissected away from the thyroid capsule and preserved along with its associated blood supply. I then moved superiorly. I developed an avascular dissection plane just medial to the superior pole and grasped the superior pole with a Alleyton clamp. I carefully ligated and divided the superior vascular pedicle. As on the left side, the superior laryngeal nerve was identified just medial to the superior vascular pedicle and it was carefully preserved. I continued my dissection along the superior and lateral aspect of the gland. There was no tubercle present. I then carefully reflected the gland from lateral to medial and identified the right recurrent laryngeal nerve distally near its insertion into cricothyroid joint. I carefully dissected out the course of the nerve until it entered into the larynx. While preserving these underlying nerve branches, I carefully dissected across Lares's ligament.   A very small nub of thyroid tissue was left just adjacent to the nerve. I continued dissecting across Lares's ligament onto the anterior tracheal wall. This dissection joined with the previously dissected out left thyroid lobe and the total thyroid specimen was removed en bloc. It was passed off for permanent pathology with a suture on the right side for pathologic orientation. I irrigated out wound bed and ensured adequate hemostasis using bipolar electrocautery. I restimulated both right and left recurrent laryngeal nerves which stimulated strongly. I placed a 10-Greek round RUPERTO drain through a separate stab incision just below the incision line. This was secured in place using a 2-0 silk drain suture. I then reapproximated strap muscles in the midline using a running 3-0 Vicryl suture. The incision was then closed in layers using 3-0 undyed Vicryl deep suture for the platysma and dermis followed by 5-0 running subcutaneous Monocryl suture for the skin. Mastisol and Steri-Strips were placed over the incision. This concluded the surgical portion of the procedure. The patient was then awakened from the anesthesia, extubated and taken to the PACU in stable condition afterwards.       Kasey Wang MD      [id number]  D:  08/12/2020 13:18  T:  08/12/2020 19:48  JOB #:  [id number]

## 2020-08-12 NOTE — PROGRESS NOTES
Problem: Falls - Risk of  Goal: *Absence of Falls  Description: Document Royal Meza Fall Risk and appropriate interventions in the flowsheet.   Outcome: Progressing Towards Goal  Note: Fall Risk Interventions:  Mobility Interventions: Bed/chair exit alarm         Medication Interventions: Bed/chair exit alarm    Elimination Interventions: Call light in reach              Problem: Pain  Goal: *Control of Pain  Outcome: Progressing Towards Goal

## 2020-08-13 VITALS
OXYGEN SATURATION: 97 % | HEIGHT: 64 IN | RESPIRATION RATE: 16 BRPM | SYSTOLIC BLOOD PRESSURE: 116 MMHG | BODY MASS INDEX: 23.22 KG/M2 | DIASTOLIC BLOOD PRESSURE: 62 MMHG | WEIGHT: 136 LBS | TEMPERATURE: 98.6 F | HEART RATE: 61 BPM

## 2020-08-13 LAB — CALCIUM SERPL-MCNC: 8 MG/DL (ref 8.3–10.4)

## 2020-08-13 PROCEDURE — 82310 ASSAY OF CALCIUM: CPT

## 2020-08-13 PROCEDURE — 74011250637 HC RX REV CODE- 250/637: Performed by: OTOLARYNGOLOGY

## 2020-08-13 PROCEDURE — 74011250636 HC RX REV CODE- 250/636: Performed by: OTOLARYNGOLOGY

## 2020-08-13 PROCEDURE — 36415 COLL VENOUS BLD VENIPUNCTURE: CPT

## 2020-08-13 RX ORDER — LEVOTHYROXINE SODIUM 100 UG/1
100 TABLET ORAL
Qty: 30 TAB | Refills: 5 | Status: SHIPPED | OUTPATIENT
Start: 2020-08-14 | End: 2020-09-11 | Stop reason: SDUPTHER

## 2020-08-13 RX ADMIN — Medication 10 ML: at 06:05

## 2020-08-13 RX ADMIN — OYSTER SHELL CALCIUM WITH VITAMIN D 2 TABLET: 500; 200 TABLET, FILM COATED ORAL at 07:54

## 2020-08-13 RX ADMIN — LEVOTHYROXINE SODIUM 100 MCG: 0.1 TABLET ORAL at 06:04

## 2020-08-13 RX ADMIN — HEPARIN SODIUM 5000 UNITS: 5000 INJECTION INTRAVENOUS; SUBCUTANEOUS at 04:16

## 2020-08-13 RX ADMIN — HYDROCODONE BITARTRATE AND ACETAMINOPHEN 1 TABLET: 5; 325 TABLET ORAL at 07:54

## 2020-08-13 RX ADMIN — PROPRANOLOL HYDROCHLORIDE 60 MG: 40 TABLET ORAL at 07:54

## 2020-08-13 NOTE — DISCHARGE SUMMARY
Ear/Nose/Throat Discharge Summary      Patient ID:  Baljinder Mittal  [id number]  21 y.o.  1995    Allergies: Avocado; Banana; and Kiwi    Admit Date: 8/12/2020    Discharge Date: 8/13/2020     Admitting Physician: Joann Lawson MD     Discharge Physician: Tamiko Amin MD    * Admission Diagnoses: Graves disease    * Discharge Diagnoses: Graves disease    Admission Condition: good    * Discharged Condition: good    * Hospital Course: Admitted after undergoing total thyroidectomy back on 8/12/20. Did well in OR and had uneventful stay in PACU w/ no sxs of thyrotoxicosis. Spent night on 3rd floor. VS were stable w/ no tachycardia or fever. Pain well controlled. Her diet was advanced. Ca was 8.3/8.0 after surgery and there were no sxs of hypocalcemia. Drain was removed on POD 1 and she was deemed stable for D/C home on oral calcium supplementation. * Procedures:   Procedure(s):  TOTAL THYROIDECTOMY/NIMS      Consults: None    Significant Diagnostic Studies: post-op Ca check    Treatments: routine post-op hydration and pain control    Discharge Exam:  -NAD, well-appearing  -No stridor or hoarseness  -Neck incision healing well, steri strips in place, no hematomias  -No cervical LAD  No CN deficits    * Disposition: Home    Discharge Medications:   Current Discharge Medication List      START taking these medications    Details   levothyroxine (SYNTHROID) 100 mcg tablet Take 1 Tab by mouth every morning. Qty: 30 Tab, Refills: 5         CONTINUE these medications which have NOT CHANGED    Details   L-Norgest&E Estradiol-E Estrad (Ashlyna) 0.15 mg-30 mcg (84)/10 mcg (7) 3MPk Take 1 Tab by mouth nightly. calcium-vitamin D (OYSTER SHELL) 500 mg(1,250mg) -200 unit per tablet Take 2 Tabs by mouth three (3) times daily (with meals). Start taking 1 week prior to surgery  Qty: 100 Tab, Refills: 0      propranoloL (INDERAL) 60 mg tablet Take 1 Tab by mouth three (3) times daily.   Qty: 90 Tab, Refills: 3 Associated Diagnoses: Hyperthyroidism      cephALEXin (KEFLEX) 500 mg capsule Take 1 Cap by mouth four (4) times daily. Qty: 28 Cap, Refills: 0      ondansetron (ZOFRAN ODT) 8 mg disintegrating tablet Take 1 Tab by mouth every eight (8) hours as needed for Nausea. Qty: 8 Tab, Refills: 0         STOP taking these medications       methIMAzole (TAPAZOLE) 10 mg tablet Comments:   Reason for Stopping:               * Follow-up Care/Patient Instructions:    Activity: No lifting, Driving, or Strenuous exercise for 1 wk    Diet: Regular Diet    Wound Care: Keep wound clean and dry, you may shower and bathe as long as the steri-strips stay out of the water, those will be removed at your post-op appt    POST-OP APPT- 8/19/20 @ 1346 w/ Dr. Brooklyn Chung    Signed:  Tim Hwang MD  8/13/2020  6:56 AM

## 2020-08-13 NOTE — PROGRESS NOTES
Hourly rounds complete this shift, no new complaints at this time, PRN pain medication given per mar Patient up to the bathroom this evening shift: bed in low, locked position, call light and bedside table within reach,  all needs met. Will continue to monitor Report to day shift nurse.

## 2020-08-13 NOTE — PROGRESS NOTES
I have provided discharge instructions to pt and spouse. IV removed, tip intact. Pt already has prescriptions filled. Waiting to be wheeled downstairs.

## 2020-08-13 NOTE — DISCHARGE INSTRUCTIONS
DISCHARGE SUMMARY from Nurse    PATIENT INSTRUCTIONS:    After general anesthesia or intravenous sedation, for 24 hours or while taking prescription Narcotics:  · Limit your activities  · Do not drive and operate hazardous machinery  · Do not make important personal or business decisions  · Do  not drink alcoholic beverages  · If you have not urinated within 8 hours after discharge, please contact your surgeon on call. Report the following to your surgeon:  · Excessive pain, swelling, redness or odor of or around the surgical area  · Temperature over 100.5  · Nausea and vomiting lasting longer than 4 hours or if unable to take medications  · Any signs of decreased circulation or nerve impairment to extremity: change in color, persistent  numbness, tingling, coldness or increase pain  · Any questions    What to do at Home:  Recommended activity: Activity as tolerated    *  Please give a list of your current medications to your Primary Care Provider. *  Please update this list whenever your medications are discontinued, doses are      changed, or new medications (including over-the-counter products) are added. *  Please carry medication information at all times in case of emergency situations. These are general instructions for a healthy lifestyle:    No smoking/ No tobacco products/ Avoid exposure to second hand smoke  Surgeon General's Warning:  Quitting smoking now greatly reduces serious risk to your health. Obesity, smoking, and sedentary lifestyle greatly increases your risk for illness    A healthy diet, regular physical exercise & weight monitoring are important for maintaining a healthy lifestyle    The discharge information has been reviewed with the {PATIENT PARENT GUARDIAN:92310}. The {PATIENT PARENT GUARDIAN:30052} verbalized understanding.   Discharge medications reviewed with the {Dishcarge meds reviewed CBFW:03088} and appropriate educational materials and side effects teaching were provided. ___________________________________________________________________________________________________________________________________    -There are steri-strips in place over your neck incision. These will be removed at your post-op visit.  You may shower/bathe as long as these steri-strips remain out of the water.  -No strenuous activity or heavy lifting for 2 weeks  -Please start w/ soft foods and advance to a regular diet  -Please continue the Oscal-D you were taking the wk before surgery- 2 tabs TID- we will recheck your calcium level next wk  -You may stop your Tapazole and we are starting you on a new thyroid medication, Synthroid, which was sent to your pharmacy

## 2020-08-13 NOTE — PROGRESS NOTES
Shift assessment complete. Pt resting in bed. A&Ox4. Respirations present, even, unlabored. Lung sounds clear to auscultation. Midline neck incision c/d/i. IVF infusing without difficulty. Pt denies pain at this time. Bed in lowest position, call light within reach, side rails x3. Encouraged to call for help when needed.

## 2020-08-13 NOTE — PROGRESS NOTES
26 yo female POD 1 after total thyroidectomy- did well overnight. Pain better and taking in plenty of soft foods and liquids. Ambulating w/o difficulty. Denies any voice or swallowing issues. No numbness or tingling of extremities. Visit Vitals  /73 (BP 1 Location: Left arm, BP Patient Position: At rest)   Pulse 64   Temp 97.7 °F (36.5 °C)   Resp 16   Ht 5' 4\" (1.626 m)   Wt 136 lb (61.7 kg)   LMP  (LMP Unknown) Comment: on birth control; has a period every 3 months   SpO2 100%   BMI 23.34 kg/m²     -Neck incision healing well, steri strips in place, no hematomas, mild ecchymosis along L superior aspect of incision from retraction  -No stridor or hoarseness      Ca: 8.3/8.0    A/P: Graves disease- doing well after her thyroidectomy yesterday. Ca has decreased slightly but she denies any sxs of hypocalcemia. I removed the drain this morning and will send her home on oral Oscal-D and Synthroid.  Will recheck Ca in 1 wk    Glen Cho MD

## 2020-08-13 NOTE — PROGRESS NOTES
Problem: Falls - Risk of  Goal: *Absence of Falls  Description: Document Ivantionette Ruiz Fall Risk and appropriate interventions in the flowsheet.   Outcome: Progressing Towards Goal  Note: Fall Risk Interventions:  Mobility Interventions: Bed/chair exit alarm         Medication Interventions: Bed/chair exit alarm    Elimination Interventions: Call light in reach              Problem: Pain  Goal: *Control of Pain  Outcome: Progressing Towards Goal

## 2020-09-11 PROBLEM — E04.1 THYROID NODULE: Status: RESOLVED | Noted: 2020-01-08 | Resolved: 2020-09-11

## 2021-01-11 PROBLEM — E05.00 GRAVES DISEASE: Status: RESOLVED | Noted: 2020-08-12 | Resolved: 2021-01-11

## 2022-04-13 ENCOUNTER — APPOINTMENT (OUTPATIENT)
Dept: CT IMAGING | Age: 27
End: 2022-04-13
Attending: PHYSICIAN ASSISTANT
Payer: COMMERCIAL

## 2022-04-13 ENCOUNTER — HOSPITAL ENCOUNTER (EMERGENCY)
Age: 27
Discharge: HOME | End: 2022-04-14
Attending: EMERGENCY MEDICINE
Payer: COMMERCIAL

## 2022-04-13 VITALS
TEMPERATURE: 98.8 F | SYSTOLIC BLOOD PRESSURE: 148 MMHG | OXYGEN SATURATION: 100 % | RESPIRATION RATE: 16 BRPM | BODY MASS INDEX: 25.61 KG/M2 | HEIGHT: 64 IN | HEART RATE: 75 BPM | WEIGHT: 150 LBS | DIASTOLIC BLOOD PRESSURE: 95 MMHG

## 2022-04-13 DIAGNOSIS — R10.31 ABDOMINAL PAIN, RIGHT LOWER QUADRANT: Primary | ICD-10-CM

## 2022-04-13 LAB
ALBUMIN SERPL-MCNC: 3.5 G/DL (ref 3.5–5)
ALBUMIN/GLOB SERPL: 1 {RATIO} (ref 1.2–3.5)
ALP SERPL-CCNC: 48 U/L (ref 50–136)
ALT SERPL-CCNC: 20 U/L (ref 12–65)
ANION GAP SERPL CALC-SCNC: 6 MMOL/L (ref 7–16)
AST SERPL-CCNC: 11 U/L (ref 15–37)
BASOPHILS # BLD: 0.1 K/UL (ref 0–0.2)
BASOPHILS NFR BLD: 1 % (ref 0–2)
BILIRUB SERPL-MCNC: 0.2 MG/DL (ref 0.2–1.1)
BUN SERPL-MCNC: 16 MG/DL (ref 6–23)
CALCIUM SERPL-MCNC: 8.4 MG/DL (ref 8.3–10.4)
CHLORIDE SERPL-SCNC: 106 MMOL/L (ref 98–107)
CO2 SERPL-SCNC: 27 MMOL/L (ref 21–32)
CREAT SERPL-MCNC: 0.89 MG/DL (ref 0.6–1)
DIFFERENTIAL METHOD BLD: ABNORMAL
EOSINOPHIL # BLD: 0.9 K/UL (ref 0–0.8)
EOSINOPHIL NFR BLD: 8 % (ref 0.5–7.8)
ERYTHROCYTE [DISTWIDTH] IN BLOOD BY AUTOMATED COUNT: 12.8 % (ref 11.9–14.6)
GLOBULIN SER CALC-MCNC: 3.6 G/DL (ref 2.3–3.5)
GLUCOSE SERPL-MCNC: 88 MG/DL (ref 65–100)
HCG UR QL: NEGATIVE
HCT VFR BLD AUTO: 44.5 % (ref 35.8–46.3)
HGB BLD-MCNC: 14.9 G/DL (ref 11.7–15.4)
IMM GRANULOCYTES # BLD AUTO: 0 K/UL (ref 0–0.5)
IMM GRANULOCYTES NFR BLD AUTO: 0 % (ref 0–5)
LIPASE SERPL-CCNC: 182 U/L (ref 73–393)
LYMPHOCYTES # BLD: 4.1 K/UL (ref 0.5–4.6)
LYMPHOCYTES NFR BLD: 39 % (ref 13–44)
MCH RBC QN AUTO: 29.4 PG (ref 26.1–32.9)
MCHC RBC AUTO-ENTMCNC: 33.5 G/DL (ref 31.4–35)
MCV RBC AUTO: 87.8 FL (ref 79.6–97.8)
MONOCYTES # BLD: 0.8 K/UL (ref 0.1–1.3)
MONOCYTES NFR BLD: 8 % (ref 4–12)
NEUTS SEG # BLD: 4.8 K/UL (ref 1.7–8.2)
NEUTS SEG NFR BLD: 45 % (ref 43–78)
NRBC # BLD: 0 K/UL (ref 0–0.2)
PLATELET # BLD AUTO: 374 K/UL (ref 150–450)
PMV BLD AUTO: 8.7 FL (ref 9.4–12.3)
POTASSIUM SERPL-SCNC: 4 MMOL/L (ref 3.5–5.1)
PROT SERPL-MCNC: 7.1 G/DL (ref 6.3–8.2)
RBC # BLD AUTO: 5.07 M/UL (ref 4.05–5.2)
SODIUM SERPL-SCNC: 139 MMOL/L (ref 136–145)
WBC # BLD AUTO: 10.6 K/UL (ref 4.3–11.1)

## 2022-04-13 PROCEDURE — 96375 TX/PRO/DX INJ NEW DRUG ADDON: CPT

## 2022-04-13 PROCEDURE — 81025 URINE PREGNANCY TEST: CPT

## 2022-04-13 PROCEDURE — 85025 COMPLETE CBC W/AUTO DIFF WBC: CPT

## 2022-04-13 PROCEDURE — 74011000258 HC RX REV CODE- 258: Performed by: EMERGENCY MEDICINE

## 2022-04-13 PROCEDURE — 80053 COMPREHEN METABOLIC PANEL: CPT

## 2022-04-13 PROCEDURE — 74011250636 HC RX REV CODE- 250/636: Performed by: EMERGENCY MEDICINE

## 2022-04-13 PROCEDURE — 99285 EMERGENCY DEPT VISIT HI MDM: CPT

## 2022-04-13 PROCEDURE — 83690 ASSAY OF LIPASE: CPT

## 2022-04-13 PROCEDURE — 74177 CT ABD & PELVIS W/CONTRAST: CPT

## 2022-04-13 PROCEDURE — 74011000636 HC RX REV CODE- 636: Performed by: EMERGENCY MEDICINE

## 2022-04-13 PROCEDURE — 81003 URINALYSIS AUTO W/O SCOPE: CPT

## 2022-04-13 PROCEDURE — 96374 THER/PROPH/DIAG INJ IV PUSH: CPT

## 2022-04-13 PROCEDURE — 74011000636 HC RX REV CODE- 636: Performed by: PHYSICIAN ASSISTANT

## 2022-04-13 RX ORDER — SODIUM CHLORIDE 0.9 % (FLUSH) 0.9 %
5-10 SYRINGE (ML) INJECTION EVERY 8 HOURS
Status: DISCONTINUED | OUTPATIENT
Start: 2022-04-13 | End: 2022-04-14 | Stop reason: HOSPADM

## 2022-04-13 RX ORDER — DIPHENHYDRAMINE HYDROCHLORIDE 50 MG/ML
25 INJECTION, SOLUTION INTRAMUSCULAR; INTRAVENOUS
Status: COMPLETED | OUTPATIENT
Start: 2022-04-13 | End: 2022-04-13

## 2022-04-13 RX ORDER — SODIUM CHLORIDE 0.9 % (FLUSH) 0.9 %
5-10 SYRINGE (ML) INJECTION AS NEEDED
Status: DISCONTINUED | OUTPATIENT
Start: 2022-04-13 | End: 2022-04-14 | Stop reason: HOSPADM

## 2022-04-13 RX ORDER — MORPHINE SULFATE 4 MG/ML
4 INJECTION INTRAVENOUS
Status: COMPLETED | OUTPATIENT
Start: 2022-04-13 | End: 2022-04-13

## 2022-04-13 RX ORDER — ONDANSETRON 2 MG/ML
4 INJECTION INTRAMUSCULAR; INTRAVENOUS
Status: COMPLETED | OUTPATIENT
Start: 2022-04-13 | End: 2022-04-13

## 2022-04-13 RX ORDER — SODIUM CHLORIDE 0.9 % (FLUSH) 0.9 %
10 SYRINGE (ML) INJECTION
Status: COMPLETED | OUTPATIENT
Start: 2022-04-13 | End: 2022-04-13

## 2022-04-13 RX ADMIN — SODIUM CHLORIDE 1000 ML: 900 INJECTION, SOLUTION INTRAVENOUS at 22:01

## 2022-04-13 RX ADMIN — SODIUM CHLORIDE 100 ML: 9 INJECTION, SOLUTION INTRAVENOUS at 22:48

## 2022-04-13 RX ADMIN — MORPHINE SULFATE 4 MG: 4 INJECTION INTRAVENOUS at 23:07

## 2022-04-13 RX ADMIN — DIPHENHYDRAMINE HYDROCHLORIDE 25 MG: 50 INJECTION, SOLUTION INTRAMUSCULAR; INTRAVENOUS at 23:17

## 2022-04-13 RX ADMIN — Medication 10 ML: at 22:48

## 2022-04-13 RX ADMIN — IOPAMIDOL 100 ML: 755 INJECTION, SOLUTION INTRAVENOUS at 22:48

## 2022-04-13 RX ADMIN — ONDANSETRON 4 MG: 2 INJECTION INTRAMUSCULAR; INTRAVENOUS at 23:07

## 2022-04-13 RX ADMIN — DIATRIZOATE MEGLUMINE AND DIATRIZOATE SODIUM 15 ML: 660; 100 LIQUID ORAL; RECTAL at 21:43

## 2022-04-13 NOTE — ED TRIAGE NOTES
Pt states she was seen at the urgent care today and referred to the ED for appendicitis rule out for abdominal pain she has been experiencing 3 days.

## 2022-04-14 NOTE — ED NOTES
While pushing morphine injection, pt developed a rash on chest and arms. Pts L arm started twitching. Dr Anitra Pinedo notified and at bedside to assess.  Orders for benadryl

## 2022-04-14 NOTE — ED NOTES
I have reviewed discharge instructions with the patient. The patient verbalized understanding. Patient left ED via Discharge Method: ambulatory to Home with friend. Opportunity for questions and clarification provided. Patient given 0 scripts. To continue your aftercare when you leave the hospital, you may receive an automated call from our care team to check in on how you are doing. This is a free service and part of our promise to provide the best care and service to meet your aftercare needs.  If you have questions, or wish to unsubscribe from this service please call [phone]. Thank you for Choosing our [address] [place] [address] Emergency Department.

## 2022-04-14 NOTE — ED PROVIDER NOTES
80-year-old lady presents from urgent care with concerns about right lower abdominal pain and appendicitis. She says her about the last 2 to 3 days she is a gradual increase in pain that is been associated with a loss of appetite. She says when she gets jostled or is up moving around the pain seems to increase. If she lays flat she says she has no pain. She denies any specific vomiting or diarrhea. She said no blood in her bowels or urine. Denies any vaginal symptoms. No prior history of pelvic or abdominal concerns. She says she does not take any medications. No other associated symptoms. Elements of this note were created using speech recognition software. As such, errors of speech recognition may be present.            Past Medical History:   Diagnosis Date    Depression with anxiety     GERD (gastroesophageal reflux disease)     Graves' disease     History of hyperthyroidism     History of thyroid nodule     Postsurgical hypothyroidism     Tachycardia     takes propranolol        Past Surgical History:   Procedure Laterality Date    HX THYROIDECTOMY  08/12/2020    To [facility] HX WISDOM TEETH EXTRACTION           Family History:   Problem Relation Age of Onset    Hypertension Mother     Hypertension Father     Neuropathy Father     Thyroid Cancer Neg Hx     Thyroid Disease Neg Hx        Social History     Socioeconomic History    Marital status:      Spouse name: Not on file    Number of children: Not on file    Years of education: Not on file    Highest education level: Not on file   Occupational History    Not on file   Tobacco Use    Smoking status: Never Smoker    Smokeless tobacco: Never Used   Substance and Sexual Activity    Alcohol use: Never    Drug use: Never    Sexual activity: Not on file   Other Topics Concern    Not on file   Social History Narrative    Not on file     Social Determinants of Health     Financial Resource Strain:     Difficulty of Paying Living Expenses: Not on file   Food Insecurity:     Worried About [address] [employer] in the Last Year: Not on file    Cooper of Food in the Last Year: Not on file   Transportation Needs:     Lack of Transportation (Medical): Not on file    Lack of Transportation (Non-Medical): Not on file   Physical Activity:     Days of Exercise per Week: Not on file    Minutes of Exercise per Session: Not on file   Stress:     Feeling of Stress : Not on file   Social Connections:     Frequency of Communication with Friends and Family: Not on file    Frequency of Social Gatherings with Friends and Family: Not on file    Attends Congregational Services: Not on file    Active Member of [address] [place] [address] or Organizations: Not on file    Attends Club or Organization Meetings: Not on file    Marital Status: Not on file   Intimate Partner Violence:     Fear of Current or Ex-Partner: Not on file    Emotionally Abused: Not on file    Physically Abused: Not on file    Sexually Abused: Not on file   Housing Stability:     Unable to Pay for Housing in the Last Year: Not on file    Number of [place] in the Last Year: Not on file    Unstable Housing in the Last Year: Not on file         ALLERGIES: Avocado, Banana, and Kiwi    Review of Systems   Constitutional: Positive for appetite change. Negative for chills, diaphoresis and fever. HENT: Negative for congestion, rhinorrhea and sore throat. Eyes: Negative for redness and visual disturbance. Respiratory: Negative for cough, chest tightness, shortness of breath and wheezing. Cardiovascular: Negative for chest pain and palpitations. Gastrointestinal: Positive for abdominal pain and nausea. Negative for blood in stool, diarrhea and vomiting. Endocrine: Negative for polydipsia and polyuria. Genitourinary: Negative for dysuria and hematuria. Musculoskeletal: Negative for arthralgias, myalgias and neck stiffness. Skin: Negative for rash.    Allergic/Immunologic: Negative for environmental allergies and food allergies. Neurological: Negative for dizziness, weakness and headaches. Hematological: Negative for adenopathy. Does not bruise/bleed easily. Psychiatric/Behavioral: Negative for confusion and sleep disturbance. The patient is not nervous/anxious. Vitals:    04/13/22 1928   BP: (!) 166/94   Pulse: 79   Resp: 16   Temp: 98.8 °F (37.1 °C)   SpO2: 100%   Weight: 68 kg (150 lb)   Height: 5' 4\" (1.626 m)            Physical Exam  Vitals and nursing note reviewed. Constitutional:       General: She is not in acute distress. Appearance: She is well-developed. She is not toxic-appearing. HENT:      Head: Normocephalic and atraumatic. Eyes:      General: No scleral icterus. Right eye: No discharge. Left eye: No discharge. Conjunctiva/sclera: Conjunctivae normal.      Pupils: Pupils are equal, round, and reactive to light. Cardiovascular:      Rate and Rhythm: Normal rate and regular rhythm. Heart sounds: Normal heart sounds. Pulmonary:      Effort: Pulmonary effort is normal. No respiratory distress. Breath sounds: Normal breath sounds. No wheezing or rales. Chest:      Chest wall: No tenderness. Abdominal:      General: Bowel sounds are normal. There is no distension. Palpations: Abdomen is soft. Tenderness: There is abdominal tenderness. There is no guarding or rebound. Comments: Is tender in her lower abdomen with right greater than left no rebound or guarding. Musculoskeletal:         General: No tenderness. Normal range of motion. Cervical back: Normal range of motion. No rigidity. Lymphadenopathy:      Cervical: No cervical adenopathy. Skin:     General: Skin is warm and dry. Neurological:      General: No focal deficit present. Mental Status: She is alert and oriented to person, place, and time.    Psychiatric:         Mood and Affect: Mood normal.         Behavior: Behavior normal. MDM  Number of Diagnoses or Management Options  Diagnosis management comments: We will check a CT scan to evaluate for appendicitis or other intra-abdominal pathology. She declined wanting any pain medicine during my initial evaluation. ED Course as of 04/14/22 0012   Wed Apr 13, 2022   2300 Patient says she is now having some pain and would like some medicine for. I will give her some IV morphine and Zofran. [AC]   2315 After the morphine she has some erythema and flushing after a partial dose of the morphine. I will give her some IV Benadryl. [AC]   u Apr 14, 2022   0008 CT scan is unremarkable.   I do not think she has an urgent or emergent and I will discharge her home. [AC]      ED Course User Index  [AC] Luke Medley MD       Procedures

## (undated) DEVICE — INSULATED BLADE ELECTRODE: Brand: EDGE

## (undated) DEVICE — PACKING 8004003 NEURAY 200PK 25X25MM: Brand: NEURAY ®

## (undated) DEVICE — SUTURE PERMAHAND SZ 2-0 L18IN NONABSORBABLE BLK L26MM PS 1588H

## (undated) DEVICE — DRAPE TWL SURG 16X26IN BLU ORB04] ALLCARE INC]

## (undated) DEVICE — DRAIN SURG 10FR END PERF 1/8IN SIL RND SMOOTH HEAT POLISHED

## (undated) DEVICE — SUTURE PERMAHAND SZ 3-0 L30IN NONABSORBABLE BLK SILK BRAID A304H

## (undated) DEVICE — PROBE 8225101 5PK STD PRASS FL TIP ROHS

## (undated) DEVICE — AGENT HEMSTAT W2XL14IN OXIDIZED REGENERATED CELOS ABSRB FOR

## (undated) DEVICE — STRIP,CLOSURE,WOUND,MEDI-STRIP,1/2X4: Brand: MEDLINE

## (undated) DEVICE — SUTURE MCRYL SZ 5-0 L18IN ABSRB UD L13MM P-3 3/8 CIR PRIM Y493G

## (undated) DEVICE — 2000CC GUARDIAN II: Brand: GUARDIAN

## (undated) DEVICE — REM POLYHESIVE ADULT PATIENT RETURN ELECTRODE: Brand: VALLEYLAB

## (undated) DEVICE — SPONGE: SPECIALTY PEANUT XR 100/CS: Brand: MEDICAL ACTION INDUSTRIES

## (undated) DEVICE — EMG TUBE 8229707 NIM TRIVANTAGE 7.0MM ID: Brand: NIM TRIVANTAGE™

## (undated) DEVICE — SUTURE PERMAHAND SZ 2-0 L12X18IN NONABSORBABLE BLK SILK A185H

## (undated) DEVICE — SUTURE PERMAHAND SZ 3-0 L18IN NONABSORBABLE BLK SILK BRAID A184H

## (undated) DEVICE — Device

## (undated) DEVICE — KIT PROCEDURE SURG HEAD AND NECK TOTE

## (undated) DEVICE — GOWN,REINF,POLY,ECL,PP SLV,XL: Brand: MEDLINE

## (undated) DEVICE — CONTAINER,SPECIMEN,O.R.STRL,4.5OZ: Brand: MEDLINE

## (undated) DEVICE — MAGNETIC DRAPE: Brand: DEVON

## (undated) DEVICE — BIPOLAR FORCEPS CORD: Brand: VALLEYLAB

## (undated) DEVICE — SUTURE PERMAHAND SZ 2-0 L18IN NONABSORBABLE BLK L26MM SH C012D

## (undated) DEVICE — APPLIER CLP L9.375IN APER 2.1MM CLS L3.8MM 20 SM TI CLP

## (undated) DEVICE — MASTISOL ADHESIVE LIQ 2/3ML

## (undated) DEVICE — SOLUTION IV 1000ML 0.9% SOD CHL

## (undated) DEVICE — BUTTON SWITCH PENCIL BLADE ELECTRODE, HOLSTER: Brand: EDGE

## (undated) DEVICE — SPONGE DISSECT PNUT SM 3/8IN -- 5/PK

## (undated) DEVICE — SUTURE VCRL SZ 3-0 L27IN ABSRB UD L26MM SH 1/2 CIR J416H

## (undated) DEVICE — DRAPE,INSTRUMENT,MAGNETIC,10X16: Brand: MEDLINE

## (undated) DEVICE — TRAY PREP DRY W/ PREM GLV 2 APPL 6 SPNG 2 UNDPD 1 OVERWRAP

## (undated) DEVICE — RESERVOIR,SUCTION,100CC,SILICONE: Brand: MEDLINE

## (undated) DEVICE — GAUZE,SPONGE,8"X4",12PLY,XRAY,STRL,LF: Brand: MEDLINE